# Patient Record
Sex: MALE | Race: WHITE | URBAN - METROPOLITAN AREA
[De-identification: names, ages, dates, MRNs, and addresses within clinical notes are randomized per-mention and may not be internally consistent; named-entity substitution may affect disease eponyms.]

---

## 2024-09-15 SDOH — HEALTH STABILITY: PHYSICAL HEALTH: ON AVERAGE, HOW MANY DAYS PER WEEK DO YOU ENGAGE IN MODERATE TO STRENUOUS EXERCISE (LIKE A BRISK WALK)?: 0 DAYS

## 2024-09-15 SDOH — HEALTH STABILITY: PHYSICAL HEALTH: ON AVERAGE, HOW MANY MINUTES DO YOU ENGAGE IN EXERCISE AT THIS LEVEL?: 0 MIN

## 2024-09-29 SDOH — HEALTH STABILITY: PHYSICAL HEALTH: ON AVERAGE, HOW MANY DAYS PER WEEK DO YOU ENGAGE IN MODERATE TO STRENUOUS EXERCISE (LIKE A BRISK WALK)?: 0 DAYS

## 2024-09-29 SDOH — HEALTH STABILITY: PHYSICAL HEALTH: ON AVERAGE, HOW MANY MINUTES DO YOU ENGAGE IN EXERCISE AT THIS LEVEL?: 0 MIN

## 2024-10-02 ENCOUNTER — OFFICE VISIT (OUTPATIENT)
Dept: ORTHOPEDIC SURGERY | Age: 59
End: 2024-10-02
Payer: COMMERCIAL

## 2024-10-02 VITALS — BODY MASS INDEX: 38.51 KG/M2 | WEIGHT: 260 LBS | HEIGHT: 69 IN

## 2024-10-02 DIAGNOSIS — M77.12 LATERAL EPICONDYLITIS OF LEFT ELBOW: Primary | ICD-10-CM

## 2024-10-02 PROCEDURE — 99203 OFFICE O/P NEW LOW 30 MIN: CPT | Performed by: STUDENT IN AN ORGANIZED HEALTH CARE EDUCATION/TRAINING PROGRAM

## 2024-10-02 RX ORDER — CLORAZEPATE DIPOTASSIUM 7.5 MG/1
7.5 TABLET ORAL 2 TIMES DAILY
COMMUNITY
Start: 2024-08-15 | End: 2024-11-13

## 2024-10-02 RX ORDER — PROPRANOLOL HCL 60 MG
180 CAPSULE, EXTENDED RELEASE 24HR ORAL
COMMUNITY
Start: 2023-12-15

## 2024-10-02 RX ORDER — METHOCARBAMOL 500 MG/1
500 TABLET, FILM COATED ORAL 4 TIMES DAILY
COMMUNITY

## 2024-10-02 RX ORDER — DULAGLUTIDE 4.5 MG/.5ML
4.5 INJECTION, SOLUTION SUBCUTANEOUS
COMMUNITY
Start: 2024-09-23

## 2024-10-02 RX ORDER — PROCHLORPERAZINE MALEATE 10 MG
10 TABLET ORAL EVERY 6 HOURS PRN
COMMUNITY
Start: 2024-04-25

## 2024-10-02 RX ORDER — ALBUTEROL SULFATE 0.83 MG/ML
2.5 SOLUTION RESPIRATORY (INHALATION) EVERY 6 HOURS PRN
COMMUNITY

## 2024-10-02 RX ORDER — CITALOPRAM HYDROBROMIDE 40 MG/1
40 TABLET ORAL DAILY
COMMUNITY
Start: 2023-12-18

## 2024-10-02 RX ORDER — VALSARTAN AND HYDROCHLOROTHIAZIDE 320; 25 MG/1; MG/1
1 TABLET, FILM COATED ORAL DAILY
COMMUNITY
Start: 2023-12-29

## 2024-10-02 RX ORDER — QUETIAPINE FUMARATE 100 MG/1
TABLET, FILM COATED ORAL
COMMUNITY
Start: 2024-06-03

## 2024-10-02 RX ORDER — CLINDAMYCIN HCL 300 MG
300 CAPSULE ORAL 4 TIMES DAILY
COMMUNITY
Start: 2024-09-25 | End: 2024-10-05

## 2024-10-02 NOTE — PROGRESS NOTES
Hand, Upper Extremity and Reconstructive Surgery                Katie Wilkes MD                                           Summary of Upper Extremity Problems and Interventions     Diagnosis: Left elbow pain    History of Present Illness     Paul Gtz is a 59 y.o. right hand dominant male who presents with left elbow pain.  Patient reports numbness and tingling in his small and ring fingers.  His symptoms have been ongoing for years with no recent change.  He did have a history of an EMG and prior workup which was performed by Dr. Fu at [place].  While we do not have this report, the patient was informed that he had findings consistent with cubital tunnel syndrome.  Does not notice any weakness of his hand.  His more significant symptoms are involve his left lateral elbow pain.  Symptoms have been ongoing on and off for approximately 20 years.  He did undergo a steroid injection for his lateral epicondylitis by Dr. Fu with no improvement.  Patient also had an MRI of his elbow that is within the [facility] system.  Patient has a history of borderline diabetes, and denies thyroid disease, upper extremity trauma and autoimmune disease.  He does have a history of sleep apnea and is not using his CPAP for the next week due to some recent sinus surgery.  Does have a history of tardive dyskinesia as a complication of medication use.     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  Reports that he keeps his wrist in neutral position while playing the organ.    Allergies     Allergies   Allergen Reactions    Benzonatate Shortness Of Breath    Shellfish Allergy Dermatitis, Hives, Itching, Palpitations, Rash, Shortness Of Breath and Swelling    Diclofenac Diarrhea    Gabapentin Other (See Comments)     Ineffective    Hydrocodone-Acetaminophen Dizziness or Vertigo     cream    Nabumetone Diarrhea    Hydrocortisone Itching, Rash and Swelling